# Patient Record
Sex: MALE | Race: AMERICAN INDIAN OR ALASKA NATIVE | NOT HISPANIC OR LATINO | Employment: OTHER | URBAN - METROPOLITAN AREA
[De-identification: names, ages, dates, MRNs, and addresses within clinical notes are randomized per-mention and may not be internally consistent; named-entity substitution may affect disease eponyms.]

---

## 2017-11-28 ENCOUNTER — TELEPHONE (OUTPATIENT)
Dept: ADMINISTRATIVE | Facility: HOSPITAL | Age: 72
End: 2017-11-28

## 2018-03-20 PROBLEM — I48.91 ATRIAL FIBRILLATION: Status: ACTIVE | Noted: 2018-03-20

## 2019-06-09 PROBLEM — R29.818 NEUROLOGICAL DEFICIT PRESENT: Status: ACTIVE | Noted: 2019-06-09

## 2019-06-09 PROBLEM — R27.0 ATAXIA: Status: ACTIVE | Noted: 2019-06-09

## 2019-06-09 PROBLEM — I63.9 CVA (CEREBRAL VASCULAR ACCIDENT): Status: ACTIVE | Noted: 2019-06-09

## 2019-07-02 PROBLEM — Z79.01 LONG TERM (CURRENT) USE OF ANTICOAGULANTS: Status: ACTIVE | Noted: 2019-07-02

## 2019-08-06 PROBLEM — L02.91 ABSCESS: Status: ACTIVE | Noted: 2019-08-06

## 2019-12-31 PROBLEM — R31.0 GROSS HEMATURIA: Status: ACTIVE | Noted: 2019-12-31

## 2020-01-01 PROBLEM — R31.9 URINARY TRACT INFECTION WITH HEMATURIA: Status: ACTIVE | Noted: 2020-01-01

## 2020-01-01 PROBLEM — N39.0 URINARY TRACT INFECTION WITH HEMATURIA: Status: ACTIVE | Noted: 2020-01-01

## 2020-01-07 PROBLEM — C64.9: Status: ACTIVE | Noted: 2020-01-07

## 2020-01-10 PROBLEM — R31.9 URINARY TRACT INFECTION WITH HEMATURIA: Status: RESOLVED | Noted: 2020-01-01 | Resolved: 2020-01-10

## 2020-01-10 PROBLEM — E63.9 INADEQUATE DIETARY ENERGY INTAKE: Status: ACTIVE | Noted: 2020-01-10

## 2020-01-10 PROBLEM — R31.0 GROSS HEMATURIA: Status: RESOLVED | Noted: 2019-12-31 | Resolved: 2020-01-10

## 2020-01-10 PROBLEM — K59.00 CONSTIPATION: Chronic | Status: ACTIVE | Noted: 2020-01-10

## 2020-01-10 PROBLEM — N39.0 URINARY TRACT INFECTION WITH HEMATURIA: Status: RESOLVED | Noted: 2020-01-01 | Resolved: 2020-01-10

## 2020-01-11 PROBLEM — K56.7 ILEUS: Status: ACTIVE | Noted: 2020-01-11

## 2020-01-13 PROBLEM — K56.7 ILEUS: Status: RESOLVED | Noted: 2020-01-11 | Resolved: 2020-01-13

## 2020-01-20 PROBLEM — C64.9: Status: RESOLVED | Noted: 2020-01-07 | Resolved: 2020-01-20

## 2020-01-22 PROBLEM — L89.92 PRESSURE INJURY, STAGE 2: Status: ACTIVE | Noted: 2020-01-22

## 2020-04-21 PROBLEM — D75.1 POLYCYTHEMIA, SECONDARY: Status: ACTIVE | Noted: 2020-04-21

## 2020-07-29 PROBLEM — R31.29 MICROSCOPIC HEMATURIA: Status: ACTIVE | Noted: 2020-07-29

## 2020-08-03 PROBLEM — Z85.528 HISTORY OF TRANSITIONAL CELL CARCINOMA OF KIDNEY: Status: ACTIVE | Noted: 2020-08-03

## 2021-05-04 ENCOUNTER — PATIENT MESSAGE (OUTPATIENT)
Dept: RESEARCH | Facility: HOSPITAL | Age: 76
End: 2021-05-04

## 2021-05-17 PROBLEM — K57.92 DIVERTICULITIS: Status: ACTIVE | Noted: 2021-05-17

## 2021-06-19 PROBLEM — Z99.11 ON MECHANICALLY ASSISTED VENTILATION: Status: ACTIVE | Noted: 2021-06-19

## 2021-06-19 PROBLEM — R65.20 SEVERE SEPSIS: Status: ACTIVE | Noted: 2021-06-19

## 2021-06-19 PROBLEM — E87.20 LACTIC ACIDOSIS: Status: ACTIVE | Noted: 2021-06-19

## 2021-06-19 PROBLEM — N17.9 AKI (ACUTE KIDNEY INJURY): Status: ACTIVE | Noted: 2021-06-19

## 2021-06-19 PROBLEM — J44.1 COPD EXACERBATION: Status: ACTIVE | Noted: 2021-06-19

## 2021-06-19 PROBLEM — J96.01 ACUTE HYPOXEMIC RESPIRATORY FAILURE: Status: ACTIVE | Noted: 2021-06-19

## 2021-06-19 PROBLEM — I46.9 CARDIAC ARREST: Status: ACTIVE | Noted: 2021-06-19

## 2021-06-19 PROBLEM — A41.9 SEVERE SEPSIS: Status: ACTIVE | Noted: 2021-06-19

## 2021-06-19 PROBLEM — R57.9 SHOCK: Status: ACTIVE | Noted: 2021-06-19

## 2021-06-21 PROBLEM — R63.8 INADEQUATE ORAL INTAKE: Status: ACTIVE | Noted: 2021-06-21

## 2021-06-23 PROBLEM — R63.8 INADEQUATE ORAL INTAKE: Status: RESOLVED | Noted: 2021-06-21 | Resolved: 2021-06-23

## 2021-06-25 PROBLEM — E66.9 OBESITY, CLASS I, BMI 30-34.9: Status: ACTIVE | Noted: 2021-06-25

## 2021-06-25 PROBLEM — E83.39 HYPERPHOSPHATEMIA: Status: ACTIVE | Noted: 2021-06-25

## 2021-06-25 PROBLEM — R53.81 PHYSICAL DECONDITIONING: Status: ACTIVE | Noted: 2021-06-25

## 2021-06-25 PROBLEM — F33.1 MDD (MAJOR DEPRESSIVE DISORDER), RECURRENT EPISODE, MODERATE: Status: ACTIVE | Noted: 2021-06-25

## 2021-06-25 PROBLEM — N40.0 BPH (BENIGN PROSTATIC HYPERPLASIA): Status: ACTIVE | Noted: 2021-06-25

## 2021-06-25 PROBLEM — K08.9 POOR DENTITION: Status: ACTIVE | Noted: 2021-06-25

## 2021-06-25 PROBLEM — R74.01 TRANSAMINITIS: Status: ACTIVE | Noted: 2021-06-25

## 2021-06-25 PROBLEM — D72.829 LEUKOCYTOSIS: Status: ACTIVE | Noted: 2021-06-25

## 2021-06-25 PROBLEM — F33.0 MDD (MAJOR DEPRESSIVE DISORDER), RECURRENT EPISODE, MILD: Status: ACTIVE | Noted: 2021-06-25

## 2021-06-25 PROBLEM — B37.1: Status: ACTIVE | Noted: 2021-06-25

## 2021-06-25 PROBLEM — D64.9 NORMOCYTIC ANEMIA: Status: ACTIVE | Noted: 2021-06-25

## 2021-06-27 PROBLEM — E55.9 VITAMIN D DEFICIENCY: Status: ACTIVE | Noted: 2021-06-27

## 2021-06-27 PROBLEM — I47.29 NSVT (NONSUSTAINED VENTRICULAR TACHYCARDIA): Status: ACTIVE | Noted: 2021-06-27

## 2023-01-31 ENCOUNTER — OFFICE VISIT (OUTPATIENT)
Dept: INTERVENTIONAL RADIOLOGY/VASCULAR | Facility: CLINIC | Age: 78
End: 2023-01-31
Payer: MEDICARE

## 2023-01-31 VITALS
HEART RATE: 75 BPM | SYSTOLIC BLOOD PRESSURE: 152 MMHG | HEIGHT: 66 IN | BODY MASS INDEX: 28.14 KG/M2 | DIASTOLIC BLOOD PRESSURE: 79 MMHG | WEIGHT: 175.06 LBS

## 2023-01-31 DIAGNOSIS — Z85.528 HISTORY OF TRANSITIONAL CELL CARCINOMA OF KIDNEY: ICD-10-CM

## 2023-01-31 DIAGNOSIS — R59.9 LYMPH NODE ENLARGEMENT: Primary | ICD-10-CM

## 2023-01-31 DIAGNOSIS — D49.9 NEOPLASM: ICD-10-CM

## 2023-01-31 PROCEDURE — 99999 PR PBB SHADOW E&M-EST. PATIENT-LVL III: ICD-10-PCS | Mod: PBBFAC,,,

## 2023-01-31 PROCEDURE — 1159F MED LIST DOCD IN RCRD: CPT | Mod: CPTII,S$GLB,,

## 2023-01-31 PROCEDURE — 1160F PR REVIEW ALL MEDS BY PRESCRIBER/CLIN PHARMACIST DOCUMENTED: ICD-10-PCS | Mod: CPTII,S$GLB,,

## 2023-01-31 PROCEDURE — 1160F RVW MEDS BY RX/DR IN RCRD: CPT | Mod: CPTII,S$GLB,,

## 2023-01-31 PROCEDURE — 99204 PR OFFICE/OUTPT VISIT, NEW, LEVL IV, 45-59 MIN: ICD-10-PCS | Mod: S$GLB,,,

## 2023-01-31 PROCEDURE — 3077F PR MOST RECENT SYSTOLIC BLOOD PRESSURE >= 140 MM HG: ICD-10-PCS | Mod: CPTII,S$GLB,,

## 2023-01-31 PROCEDURE — 3078F DIAST BP <80 MM HG: CPT | Mod: CPTII,S$GLB,,

## 2023-01-31 PROCEDURE — 3078F PR MOST RECENT DIASTOLIC BLOOD PRESSURE < 80 MM HG: ICD-10-PCS | Mod: CPTII,S$GLB,,

## 2023-01-31 PROCEDURE — 99999 PR PBB SHADOW E&M-EST. PATIENT-LVL III: CPT | Mod: PBBFAC,,,

## 2023-01-31 PROCEDURE — 99204 OFFICE O/P NEW MOD 45 MIN: CPT | Mod: S$GLB,,,

## 2023-01-31 PROCEDURE — 3077F SYST BP >= 140 MM HG: CPT | Mod: CPTII,S$GLB,,

## 2023-01-31 PROCEDURE — 1159F PR MEDICATION LIST DOCUMENTED IN MEDICAL RECORD: ICD-10-PCS | Mod: CPTII,S$GLB,,

## 2023-01-31 NOTE — PROGRESS NOTES
"Subjective:       Patient ID: Maninder Quiros is a 77 y.o. male.    Chief Complaint: Back Pain    Referral from Dr. Ronda Shore MD for a retroperitoneal LN biopsy.   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 Patient was evaluated in 12/2022 for lumbar radiculopathy in the ED. CT lumbar during workup revealed "retroperitoneal lymphadenopathy concerning for malignancy." Denies fever, SOB, or Chest pain. Endorses some unexpected weight loss. No other complaints today. Would like to proceed with scheduling biopsy.     PMH and Medications reviewed.   Heme/onc clinic note reviewed.   History of PINA non compliant with CPAP due to recall.   Patient takes Eliquis and Aspirin (will hold prior to procedure).     Review of Systems   Constitutional:  Positive for activity change, appetite change and unexpected weight change. Negative for fatigue and fever.   HENT:  Positive for nasal congestion and sinus pressure/congestion.    Respiratory:  Negative for cough and shortness of breath.    Cardiovascular:  Negative for chest pain and leg swelling.   Gastrointestinal:  Negative for abdominal pain, constipation, diarrhea, nausea and vomiting.   Genitourinary:  Negative for difficulty urinating and dysuria.   Musculoskeletal:  Positive for back pain (Has improved.).   Neurological:  Negative for dizziness and headaches.   Psychiatric/Behavioral:  Negative for behavioral problems and confusion.        Objective:      Physical Exam  Constitutional:       General: He is not in acute distress.     Appearance: Normal appearance. He is not toxic-appearing or diaphoretic.   HENT:      Head: Normocephalic and atraumatic.      Right Ear: External ear normal.      Left Ear: External ear normal.      Nose: Nose normal.   Eyes:      General: Lids are normal.         Right eye: No discharge.         Left eye: No discharge.      Conjunctiva/sclera: " Conjunctivae normal.   Cardiovascular:      Rate and Rhythm: Normal rate and regular rhythm.   Pulmonary:      Effort: Pulmonary effort is normal. No accessory muscle usage or respiratory distress.      Breath sounds: Normal breath sounds.   Abdominal:      General: Abdomen is flat. Bowel sounds are normal. There is no distension.      Palpations: Abdomen is soft. There is no mass.      Tenderness: There is no abdominal tenderness. There is no right CVA tenderness or left CVA tenderness.   Musculoskeletal:         General: No tenderness (Lumbar).      Right lower leg: No edema.      Left lower leg: No edema.      Comments: Back brace in place.    Skin:     General: Skin is warm and dry.      Coloration: Skin is not jaundiced.   Neurological:      General: No focal deficit present.      Mental Status: He is alert and oriented to person, place, and time.      Cranial Nerves: No facial asymmetry.      Coordination: Coordination normal.      Gait: Gait is intact.   Psychiatric:         Attention and Perception: Attention normal.         Mood and Affect: Mood normal.         Speech: Speech normal.         Behavior: Behavior normal.       CT Abdomen and Pelvis (1/13/2023)      Assessment and Plan       Lymph node enlargement  -     IR Biopsy Lymph Node; Future; Expected date: 01/31/2023  -     CBC Auto Differential; Future; Expected date: 01/31/2023  -     Protime-INR; Future; Expected date: 01/31/2023    - Retroperitoneal LN biopsy approved by Dr. Monterroso.  - Patient will hold Eliquis 2 days prior to the procedure (normal kidney function on labs 1/10/23587)  - Patient will hold aspirin 5 days prior to procedure.    Discussed how the procedure will be performed, risks (including, but not limited to, pain, bleeding, infection, damage to nearby structures, SEEDING, and the need for additional procedures), benefits, possible complications, pre-post procedure expectations, and alternatives. The patient voices understanding and  all questions have been answered.  The patient agrees to proceed as planned. Patient scheduled for 3/1/2023. Pre-procedure handout with clinic phone number provided.    Emily Davidson PA-C  Interventional Radiology  [phone]

## 2023-01-31 NOTE — LETTER
January 31, 2023    Maninder Quiros  [address]  [place] [address]     [facility]  [address]  [facility] [address]  Phone: [phone] PRE-PROCEDURE INSTRUCTIONS    Your procedure with Interventional Radiology is scheduled for 3/1/2023. Please arrive by 10:30am.    DO NOT take Eliquis for 2 days before your procedure.    **Do not eat or drink anything between midnight and the time of your procedure. This includes gum, mints, and candy lemon drops.    **Do not smoke or drink alcoholic beverages 24 hours prior to your procedure.    **If you wear contact lenses, dentures, hearing aids, or glasses, bring a container to put them in during the procedure and give them to a family member for safekeeping.    **If you have been diagnosed with sleep apnea please bring your CPAP machine.    **If your doctor has scheduled you for an overnight stay, bring a small overnight bag with any personal items that you may need.    **Make arrangements in advance for transportation home by a responsible adult. It is not safe to drive a vehicle during the 24 hours following the procedure.    **All [facility] facilities and properties are tobacco free. Smoking is NOT allowed.    PLEASE NOTE: The procedure schedule has many variables which affect the time of your procedure. Family members should be available if your surgery time changes.    If you have any questions about these instructions call Interventional Radiology at [phone] Monday - Friday between 8:00am and 4:00pm or [phone] (ask for interventional radiology resident) for after hours.

## 2023-02-28 ENCOUNTER — PATIENT MESSAGE (OUTPATIENT)
Dept: INTERVENTIONAL RADIOLOGY/VASCULAR | Facility: HOSPITAL | Age: 78
End: 2023-02-28
Payer: MEDICARE

## 2023-02-28 ENCOUNTER — DOCUMENTATION ONLY (OUTPATIENT)
Dept: PREADMISSION TESTING | Facility: HOSPITAL | Age: 78
End: 2023-02-28
Payer: MEDICARE

## 2023-03-01 ENCOUNTER — HOSPITAL ENCOUNTER (OUTPATIENT)
Dept: INTERVENTIONAL RADIOLOGY/VASCULAR | Facility: HOSPITAL | Age: 78
Discharge: HOME | End: 2023-03-01
Payer: MEDICARE

## 2023-03-01 ENCOUNTER — ANESTHESIA (OUTPATIENT)
Dept: INTERVENTIONAL RADIOLOGY/VASCULAR | Facility: HOSPITAL | Age: 78
End: 2023-03-01
Payer: MEDICARE

## 2023-03-01 VITALS
TEMPERATURE: 98 F | DIASTOLIC BLOOD PRESSURE: 85 MMHG | SYSTOLIC BLOOD PRESSURE: 160 MMHG | OXYGEN SATURATION: 93 % | HEIGHT: 66 IN | HEART RATE: 82 BPM | RESPIRATION RATE: 17 BRPM | BODY MASS INDEX: 28.28 KG/M2 | WEIGHT: 176 LBS

## 2023-03-01 DIAGNOSIS — R59.9 LYMPH NODE ENLARGEMENT: ICD-10-CM

## 2023-03-01 DIAGNOSIS — Z85.528 HISTORY OF TRANSITIONAL CELL CARCINOMA OF KIDNEY: ICD-10-CM

## 2023-03-01 LAB
POCT GLUCOSE: 113 MG/DL (ref 70–110)
POCT GLUCOSE: 122 MG/DL (ref 70–110)

## 2023-03-01 PROCEDURE — 88307 PR  SURG PATH,LEVEL V: ICD-10-PCS | Mod: 26,,, | Performed by: PATHOLOGY

## 2023-03-01 PROCEDURE — 88333 PR  INTRAOPERATIVE CYTO PATH CONSULT, INITIAL SITE: ICD-10-PCS | Mod: 26,,, | Performed by: PATHOLOGY

## 2023-03-01 PROCEDURE — 88342 IMHCHEM/IMCYTCHM 1ST ANTB: CPT | Mod: 59 | Performed by: PATHOLOGY

## 2023-03-01 PROCEDURE — 88185 FLOWCYTOMETRY/TC ADD-ON: CPT | Mod: 59 | Performed by: PATHOLOGY

## 2023-03-01 PROCEDURE — 88341 PR IHC OR ICC EACH ADD'L SINGLE ANTIBODY  STAINPR: ICD-10-PCS | Mod: 26,59,, | Performed by: PATHOLOGY

## 2023-03-01 PROCEDURE — 27201068 IR BIOPSY LYMPH NODE

## 2023-03-01 PROCEDURE — 88307 TISSUE EXAM BY PATHOLOGIST: CPT | Performed by: PATHOLOGY

## 2023-03-01 PROCEDURE — 25000003 PHARM REV CODE 250: Performed by: NURSE ANESTHETIST, CERTIFIED REGISTERED

## 2023-03-01 PROCEDURE — 88307 TISSUE EXAM BY PATHOLOGIST: CPT | Mod: 26,,, | Performed by: PATHOLOGY

## 2023-03-01 PROCEDURE — D9220A PRA ANESTHESIA: ICD-10-PCS | Mod: ANES,,, | Performed by: ANESTHESIOLOGY

## 2023-03-01 PROCEDURE — 88342 CHG IMMUNOCYTOCHEMISTRY: ICD-10-PCS | Mod: 26,59,, | Performed by: PATHOLOGY

## 2023-03-01 PROCEDURE — 38505 IR BIOPSY LYMPH NODE: ICD-10-PCS | Mod: LT,,, | Performed by: STUDENT IN AN ORGANIZED HEALTH CARE EDUCATION/TRAINING PROGRAM

## 2023-03-01 PROCEDURE — 63600175 PHARM REV CODE 636 W HCPCS: Performed by: NURSE ANESTHETIST, CERTIFIED REGISTERED

## 2023-03-01 PROCEDURE — 77012 CT SCAN FOR NEEDLE BIOPSY: CPT | Mod: TC | Performed by: STUDENT IN AN ORGANIZED HEALTH CARE EDUCATION/TRAINING PROGRAM

## 2023-03-01 PROCEDURE — 37000009 HC ANESTHESIA EA ADD 15 MINS

## 2023-03-01 PROCEDURE — 88341 IMHCHEM/IMCYTCHM EA ADD ANTB: CPT | Performed by: PATHOLOGY

## 2023-03-01 PROCEDURE — 38505 NEEDLE BIOPSY LYMPH NODES: CPT | Performed by: STUDENT IN AN ORGANIZED HEALTH CARE EDUCATION/TRAINING PROGRAM

## 2023-03-01 PROCEDURE — 88342 IMHCHEM/IMCYTCHM 1ST ANTB: CPT | Mod: 26,59,, | Performed by: PATHOLOGY

## 2023-03-01 PROCEDURE — 88184 FLOWCYTOMETRY/ TC 1 MARKER: CPT | Performed by: PATHOLOGY

## 2023-03-01 PROCEDURE — D9220A PRA ANESTHESIA: Mod: ANES,,, | Performed by: ANESTHESIOLOGY

## 2023-03-01 PROCEDURE — 88189 FLOWCYTOMETRY/READ 16 & >: CPT | Mod: ,,, | Performed by: PATHOLOGY

## 2023-03-01 PROCEDURE — D9220A PRA ANESTHESIA: Mod: CRNA,,, | Performed by: NURSE ANESTHETIST, CERTIFIED REGISTERED

## 2023-03-01 PROCEDURE — 77012 PR  CT GUIDANCE NEEDLE PLACEMENT: ICD-10-PCS | Mod: 26,,, | Performed by: STUDENT IN AN ORGANIZED HEALTH CARE EDUCATION/TRAINING PROGRAM

## 2023-03-01 PROCEDURE — 77012 CT SCAN FOR NEEDLE BIOPSY: CPT | Mod: 26,,, | Performed by: STUDENT IN AN ORGANIZED HEALTH CARE EDUCATION/TRAINING PROGRAM

## 2023-03-01 PROCEDURE — 82962 GLUCOSE BLOOD TEST: CPT

## 2023-03-01 PROCEDURE — 88189 PR  FLOWCYTOMETRY/READ, 16 & > MARKERS: ICD-10-PCS | Mod: ,,, | Performed by: PATHOLOGY

## 2023-03-01 PROCEDURE — 37000008 HC ANESTHESIA 1ST 15 MINUTES

## 2023-03-01 PROCEDURE — 88333 PATH CONSLTJ SURG CYTO XM 1: CPT | Performed by: PATHOLOGY

## 2023-03-01 PROCEDURE — 88333 PATH CONSLTJ SURG CYTO XM 1: CPT | Mod: 26,,, | Performed by: PATHOLOGY

## 2023-03-01 PROCEDURE — 25000003 PHARM REV CODE 250: Performed by: FAMILY MEDICINE

## 2023-03-01 PROCEDURE — D9220A PRA ANESTHESIA: ICD-10-PCS | Mod: CRNA,,, | Performed by: NURSE ANESTHETIST, CERTIFIED REGISTERED

## 2023-03-01 PROCEDURE — 88341 IMHCHEM/IMCYTCHM EA ADD ANTB: CPT | Mod: 26,59,, | Performed by: PATHOLOGY

## 2023-03-01 RX ORDER — HYDROMORPHONE HYDROCHLORIDE 1 MG/ML
0.2 INJECTION, SOLUTION INTRAMUSCULAR; INTRAVENOUS; SUBCUTANEOUS EVERY 5 MIN PRN
Status: DISCONTINUED | OUTPATIENT
Start: 2023-03-01 | End: 2023-03-02 | Stop reason: HOSPADM

## 2023-03-01 RX ORDER — PROCHLORPERAZINE EDISYLATE 5 MG/ML
5 INJECTION INTRAMUSCULAR; INTRAVENOUS EVERY 30 MIN PRN
Status: DISCONTINUED | OUTPATIENT
Start: 2023-03-01 | End: 2023-03-02 | Stop reason: HOSPADM

## 2023-03-01 RX ORDER — ONDANSETRON 2 MG/ML
INJECTION INTRAMUSCULAR; INTRAVENOUS
Status: DISCONTINUED | OUTPATIENT
Start: 2023-03-01 | End: 2023-03-01

## 2023-03-01 RX ORDER — LABETALOL HYDROCHLORIDE 5 MG/ML
INJECTION, SOLUTION INTRAVENOUS
Status: DISCONTINUED | OUTPATIENT
Start: 2023-03-01 | End: 2023-03-01

## 2023-03-01 RX ORDER — PROPOFOL 10 MG/ML
VIAL (ML) INTRAVENOUS
Status: DISCONTINUED | OUTPATIENT
Start: 2023-03-01 | End: 2023-03-01

## 2023-03-01 RX ORDER — LIDOCAINE HYDROCHLORIDE 10 MG/ML
1 INJECTION, SOLUTION EPIDURAL; INFILTRATION; INTRACAUDAL; PERINEURAL ONCE
Status: DISCONTINUED | OUTPATIENT
Start: 2023-03-01 | End: 2023-03-02 | Stop reason: HOSPADM

## 2023-03-01 RX ORDER — LIDOCAINE HYDROCHLORIDE 20 MG/ML
INJECTION INTRAVENOUS
Status: DISCONTINUED | OUTPATIENT
Start: 2023-03-01 | End: 2023-03-01

## 2023-03-01 RX ORDER — FENTANYL CITRATE 50 UG/ML
INJECTION, SOLUTION INTRAMUSCULAR; INTRAVENOUS
Status: DISCONTINUED | OUTPATIENT
Start: 2023-03-01 | End: 2023-03-01

## 2023-03-01 RX ORDER — SODIUM CHLORIDE 9 MG/ML
INJECTION, SOLUTION INTRAVENOUS CONTINUOUS
Status: DISCONTINUED | OUTPATIENT
Start: 2023-03-01 | End: 2023-03-02 | Stop reason: HOSPADM

## 2023-03-01 RX ORDER — SODIUM CHLORIDE 0.9 % (FLUSH) 0.9 %
3 SYRINGE (ML) INJECTION
Status: DISCONTINUED | OUTPATIENT
Start: 2023-03-01 | End: 2023-03-02 | Stop reason: HOSPADM

## 2023-03-01 RX ORDER — DEXAMETHASONE SODIUM PHOSPHATE 4 MG/ML
INJECTION, SOLUTION INTRA-ARTICULAR; INTRALESIONAL; INTRAMUSCULAR; INTRAVENOUS; SOFT TISSUE
Status: DISCONTINUED | OUTPATIENT
Start: 2023-03-01 | End: 2023-03-01

## 2023-03-01 RX ORDER — PHENYLEPHRINE HYDROCHLORIDE 10 MG/ML
INJECTION INTRAVENOUS
Status: DISCONTINUED | OUTPATIENT
Start: 2023-03-01 | End: 2023-03-01

## 2023-03-01 RX ORDER — HALOPERIDOL 5 MG/ML
0.5 INJECTION INTRAMUSCULAR EVERY 10 MIN PRN
Status: DISCONTINUED | OUTPATIENT
Start: 2023-03-01 | End: 2023-03-02 | Stop reason: HOSPADM

## 2023-03-01 RX ORDER — ROCURONIUM BROMIDE 10 MG/ML
INJECTION, SOLUTION INTRAVENOUS
Status: DISCONTINUED | OUTPATIENT
Start: 2023-03-01 | End: 2023-03-01

## 2023-03-01 RX ADMIN — LIDOCAINE HYDROCHLORIDE 100 MG: 20 INJECTION INTRAVENOUS at 02:03

## 2023-03-01 RX ADMIN — PHENYLEPHRINE HYDROCHLORIDE 100 MCG: 10 INJECTION INTRAVENOUS at 02:03

## 2023-03-01 RX ADMIN — DEXAMETHASONE SODIUM PHOSPHATE 4 MG: 4 INJECTION, SOLUTION INTRAMUSCULAR; INTRAVENOUS at 02:03

## 2023-03-01 RX ADMIN — PROPOFOL 150 MG: 10 INJECTION, EMULSION INTRAVENOUS at 02:03

## 2023-03-01 RX ADMIN — PHENYLEPHRINE HYDROCHLORIDE 200 MCG: 10 INJECTION INTRAVENOUS at 02:03

## 2023-03-01 RX ADMIN — ROCURONIUM BROMIDE 50 MG: 10 INJECTION INTRAVENOUS at 02:03

## 2023-03-01 RX ADMIN — LABETALOL HYDROCHLORIDE 10 MG: 5 INJECTION, SOLUTION INTRAVENOUS at 03:03

## 2023-03-01 RX ADMIN — SODIUM CHLORIDE: 0.9 INJECTION, SOLUTION INTRAVENOUS at 02:03

## 2023-03-01 RX ADMIN — ONDANSETRON 4 MG: 2 INJECTION INTRAMUSCULAR; INTRAVENOUS at 02:03

## 2023-03-01 RX ADMIN — FENTANYL CITRATE 100 MCG: 50 INJECTION, SOLUTION INTRAMUSCULAR; INTRAVENOUS at 02:03

## 2023-03-01 NOTE — PLAN OF CARE
Discharge instructions given, patient and family member, verbalized understanding. Consents verified. Vitals back to baseline. Pt tolerating po liquids. Dressing to left back clean, dry and intact.

## 2023-03-01 NOTE — PLAN OF CARE
Pt arrived to room IR CT for Retroperitoneal Lymph Node biopsy. Pt oriented to room and introduced to staff. Pt.  positioned prone, feet first on table. monitors applied.  Anesthesia present for procedure. Please see anesthesia record for medications, assessments and vital signs.

## 2023-03-01 NOTE — H&P
"Radiology Pre-procedure Note    History of Present Illness:  Maninder Quiros is a 77 y.o. 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 Patient was evaluated in 12/2022 for lumbar radiculopathy in the ED. CT lumbar during workup revealed "retroperitoneal lymphadenopathy concerning for malignancy."     Patient presents for Left retroperitoneal lymph node biopsy.     Past Medical History:   Diagnosis Date    Anticoagulant long-term use     Arthritis     Atrial fibrillation     Cancer 2020    right nephrectomy    COPD (chronic obstructive pulmonary disease)     Coronary artery disease     Diabetes mellitus, type 2     ANDERSON (dyspnea on exertion)     Hyperlipidemia     Hypertension     Leukocytosis     MI (myocardial infarction) 2015    Sleep apnea     c pap not in use    Stroke 2019    Wears glasses      Past Surgical History:   Procedure Laterality Date    ADENOIDECTOMY      APPENDECTOMY      BIOPSY OF BLADDER  8/3/2020    Procedure: BIOPSY, BLADDER;  Surgeon: Tyrone Rousseau MD;  Location: [facility];  Service: Urology;;    BLADDER FULGURATION N/A 8/3/2020    Procedure: FULGURATION, BLADDER;  Surgeon: Tyrone Rousseau MD;  Location: [facility] OR;  Service: Urology;  Laterality: N/A;    CHOLECYSTECTOMY      COLONOSCOPY      COLONOSCOPY N/A 3/1/2016    Procedure: COLONOSCOPY;  Surgeon: Austin Lyn MD;  Location: [facility];  Service: Endoscopy;  Laterality: N/A;    CYSTOSCOPY W/ RETROGRADES Bilateral 1/3/2020    Procedure: CYSTOSCOPY WITH RETROGRADE PYELOGRAM;  Surgeon: Tyrone Rousseau MD;  Location: [facility];  Service: Urology;  Laterality: Bilateral;    CYSTOSCOPY W/ RETROGRADES Left 8/3/2020    Procedure: CYSTOSCOPY WITH RETROGRADE PYELOGRAM;  Surgeon: Tyrone Rousseau MD;  Location: [facility];  Service: Urology;  Laterality: Left;  covid screen in progress 7/29/2020    CYSTOSCOPY W/ RETROGRADES Left 4/27/2021    Procedure: CYSTOSCOPY WITH " RETROGRADE PYELOGRAM;  Surgeon: Tyrone Rousseau MD;  Location: [facility] OR;  Service: Urology;  Laterality: Left;  7am per Revee    CYSTOSCOPY W/ RETROGRADES Left 12/2/2021    Procedure: CYSTOSCOPY WITH RETROGRADE PYELOGRAM;  Surgeon: Tyrone Rousseau MD;  Location: [facility] OR;  Service: Urology;  Laterality: Left;    DIGITAL RECTAL EXAMINATION UNDER ANESTHESIA N/A 4/27/2021    Procedure: EXAM UNDER ANESTHESIA, DIGITAL, RECTUM;  Surgeon: Tyrone Rousseau MD;  Location: [facility] OR;  Service: Urology;  Laterality: N/A;    DILATION OF URETHRA N/A 1/3/2020    Procedure: DILATION, URETHRA;  Surgeon: Tyrone Rousseau MD;  Location: [facility] OR;  Service: Urology;  Laterality: N/A;    DILATION OF URETHRA N/A 8/3/2020    Procedure: DILATION, URETHRA;  Surgeon: Tyrone Rousseau MD;  Location: [facility] OR;  Service: Urology;  Laterality: N/A;    DILATION OF URETHRA  12/2/2021    Procedure: DILATION, URETHRA;  Surgeon: Tyrone Rousseau MD;  Location: [facility] OR;  Service: Urology;;    ESOPHAGOGASTRODUODENOSCOPY      INTERNAL URETHROTOMY N/A 1/3/2020    Procedure: URETHROTOMY, INTERNAL;  Surgeon: Tyrone Rousseau MD;  Location: [facility] OR;  Service: Urology;  Laterality: N/A;    NEPHRECTOMY Right 01/02/2020    SPLENECTOMY, TOTAL      TONSILLECTOMY      URETERAL STENT PLACEMENT Right 1/3/2020    Procedure: INSERTION, STENT, URETER;  Surgeon: Tyrone Rousseau MD;  Location: [facility] OR;  Service: Urology;  Laterality: Right;    URETEROSCOPY Right 1/3/2020    Procedure: URETEROSCOPY with Biopsy;  Surgeon: Tyrone Rousseau MD;  Location: [facility];  Service: Urology;  Laterality: Right;       Review of Systems:   As documented in primary team H&P    Home Meds:   Prior to Admission medications    Medication Sig Start Date End Date Taking? Authorizing Provider   albuterol (PROVENTIL/VENTOLIN HFA) 90 mcg/actuation inhaler 2 puffs as needed    Historical Provider   apixaban (ELIQUIS) 5 mg Tab Take 5 mg by mouth 2 (two) times daily.    Historical Provider  "  aspirin 81 MG Chew Take 81 mg by mouth once daily.     Historical Provider   atorvastatin (LIPITOR) 40 MG tablet Take 1 tablet (40 mg total) by mouth every evening. 6/28/21 6/28/22  Hussain Villa NP   benazepriL (LOTENSIN) 20 MG tablet Take 20 mg by mouth nightly.    Historical Provider   blood glucose control, low (TRUE METRIX LEVEL 1) Soln 1 Dose by Misc.(Non-Drug; Combo Route) route as needed. Use as directed 4/24/19 8/31/24  Jose De Jesus Coleman MD   blood sugar diagnostic (TRUE METRIX GLUCOSE TEST STRIP) Strp 1 strip by Misc.(Non-Drug; Combo Route) route once daily. Dx E11.8 4/24/19   Jose De Jesus Coleman MD   calcium acetate,phosphat bind, (PHOSLO) 667 mg tablet Take 1 tablet (667 mg total) by mouth 3 (three) times daily.  Patient taking differently: Take 1,334 mg by mouth 3 (three) times daily. 6/28/21 2/28/23  Hussain Villa NP   citalopram (CELEXA) 20 MG tablet Take 1 tablet (20 mg total) by mouth once daily. 8/6/19 2/28/23  Henry Norton MD   ezetimibe (ZETIA) 10 mg tablet Take 10 mg by mouth every evening. 4/29/21   Historical Provider   finasteride (PROSCAR) 5 mg tablet Take 5 mg by mouth once daily. 11/24/20   Historical Provider   HYDROcodone-acetaminophen (NORCO) 5-325 mg per tablet Take 1 tablet by mouth every 6 (six) hours as needed for Pain.  Patient not taking: Reported on 2/28/2023 12/29/22   Lea Sheppard NP   lancets (TRUEPLUS LANCETS) 33 gauge Misc 1 lancet by Misc.(Non-Drug; Combo Route) route once daily. Dx E11.8 4/24/19   Jose De Jesus Coleman MD   metFORMIN (GLUCOPHAGE) 1000 MG tablet Take 1,000 mg by mouth 2 (two) times daily. 8/20/20   Historical Provider   metoprolol tartrate (LOPRESSOR) 25 MG tablet TAKE ONE TABLET BY MOUTH TWICE DAILY  Patient taking differently: Take 25 mg by mouth 2 (two) times daily. 1/14/19   Jose De Jesus Coleman MD   pen needle, diabetic (PEN NEEDLE) 32 gauge x 5/32" Ndle 1 application by Misc.(Non-Drug; Combo Route) route every evening. 6/11/19   Akash" MD [place]   tamsulosin (FLOMAX) 0.4 mg Cap Take 0.4 mg by mouth every evening. 11/24/20   Historical Provider   TURMERIC ORAL Take 2,250 mg by mouth 3 (three) times daily.    Historical Provider     Scheduled Meds:    LIDOcaine (PF) 10 mg/ml (1%)  1 mL Other Once     Continuous Infusions:    sodium chloride 0.9%       PRN Meds:  Anticoagulants/Antiplatelets: aspirin and eliquis per chart review     Allergies: Review of patient's allergies indicates:  No Known Allergies  Sedation Hx: have not been any systemic reactions    Labs:  No results for input(s): INR in the last 168 hours.    Invalid input(s):  PT,  PTT  No results for input(s): WBC, HGB, HCT, MCV, PLT in the last 168 hours. No results for input(s): GLU, NA, K, CL, CO2, BUN, CREATININE, CALCIUM, MG, ALT, AST, ALBUMIN, BILITOT, BILIDIR in the last 168 hours.      Vitals:        Physical Exam:  ASA: III  Mallampati: per anesthesia     General: no acute distress  Mental Status: alert and oriented to person, place and time  HEENT: normocephalic, atraumatic  Chest: unlabored breathing  Abdomen: nondistended  Extremity: moves all extremities      Plan:  Sedation Plan: per anesthesia.  Patient will undergo Left retroperitoneal lymph node biopsy.      Karissa Proctor MD  PGY-II  Diagnostic and Interventional Radiology  [facility]

## 2023-03-01 NOTE — ANESTHESIA PREPROCEDURE EVALUATION
03/01/2023  Maninder Quiros is a 77 y.o., male.    Patient Active Problem List   Diagnosis    Type 2 diabetes mellitus without complication    HTN (hypertension)    HLD (hyperlipidemia)    Stage 1 mild COPD by GOLD classification with exacerbation and bacterial pneumonia    Osteoarthritis of right shoulder region    Coronary artery disease involving native coronary artery without angina pectoris    Nuclear sclerosis of both eyes    Type 2 diabetes mellitus with complication, without long-term current use of insulin    Traumatic subconjunctival hemorrhage of left eye    Atrial fibrillation    Ataxia    Neurological deficit present    Hx CVA (cerebral vascular accident)    Abscess    Inadequate dietary energy intake    Constipation    Ileus    Pressure injury, stage 2    Polycythemia, secondary    Microscopic hematuria    History of transitional cell carcinoma of kidney    Solitary left kidney    Diverticulitis    TRACY (acute kidney injury)    Cardiac arrest    Acute hypoxemic respiratory failure    Cardiogenic and septic shock    Lactic acidosis    On mechanically assisted ventilation    Obesity, Class I, BMI 30-34.9    Transaminitis    MDD (major depressive disorder), recurrent episode, mild    BPH (benign prostatic hyperplasia)    Physical deconditioning    Poor dentition    Leukocytosis    Normocytic anemia    Hyperphosphatemia    Pulmonary candidiasis    NSVT (nonsustained ventricular tachycardia)    Vitamin D deficiency         Pre-op Assessment    I have reviewed the Patient Summary Reports.     I have reviewed the Nursing Notes. I have reviewed the NPO Status.   I have reviewed the Medications.     Review of Systems  Cardiovascular:   Hypertension Past MI CAD   ANDERSON    Musculoskeletal:   Arthritis     Neurological:   CVA    Psych:   Psychiatric History           Physical Exam  General: Well nourished    Airway:  Mallampati: III / II  Mouth Opening: Normal  TM Distance: Normal  Tongue: Normal  Neck ROM: Normal ROM        Anesthesia Plan  Type of Anesthesia, risks & benefits discussed:    Anesthesia Type: Gen ETT  Intra-op Monitoring Plan: Standard ASA Monitors  Post Op Pain Control Plan: multimodal analgesia  Induction:  IV  Airway Plan: Direct, Post-Induction  Informed Consent: Informed consent signed with the Patient and all parties understand the risks and agree with anesthesia plan.  All questions answered. Patient consented to blood products? Yes  ASA Score: 3  Day of Surgery Review of History & Physical: H&P Update referred to the surgeon/provider.    Ready For Surgery From Anesthesia Perspective.     .      ECHO:  Final Impressions   1. The study quality is average.   2. Global left ventricular systolic function is normal. The left   ventricular ejection fraction is 70%. Left ventricular diastolic   function is abnormal (stage I impaired relaxation).   3. Concentric left ventricular hypertrophy is present. It is mild.   4. Right ventricular systolic function is normal. Right ventricular   diastolic dimension is 3.8 cms. Right ventricular systolic pressure is   14 mmHg. TAPSE measures 1.9cm.   5. The pulmonary artery systolic pressure is 14 mmHg.   6.  The bubble study was negative, ruling out patent foramen ovale.         Lab Results   Component Value Date    WBC 11.62 02/15/2023    HGB 15.8 02/15/2023    HCT 50.2 02/15/2023    MCV 89 02/15/2023     02/15/2023       BMP  Lab Results   Component Value Date     01/10/2023    K 4.7 01/10/2023     01/10/2023    CO2 28 01/10/2023    BUN 22 (H) 01/10/2023    CREATININE 1.16 01/10/2023    CALCIUM 8.8 01/10/2023    ANIONGAP 7 (L) 01/10/2023    EGFRNORACEVR >60 01/10/2023

## 2023-03-01 NOTE — ANESTHESIA PROCEDURE NOTES
Intubation    Date/Time: 3/1/2023 2:11 PM  Performed by: Timmy Lee CRNA  Authorized by: Peter Vuong MD     Intubation:     Induction:  Intravenous    Intubated:  Postinduction    Mask Ventilation:  Easy with oral airway    Attempts:  1    Attempted By:  JEANA (LOREN Wallace CRNA)    Method of Intubation:  Video laryngoscopy    Blade:  Umana 3    Laryngeal View Grade: Grade I - full view of cords      Difficult Airway Encountered?: No      Complications:  None    Airway Device:  Oral endotracheal tube    Airway Device Size:  7.0    Style/Cuff Inflation:  Cuffed (inflated to minimal occlusive pressure)    Tube secured:  23    Secured at:  The lips    Placement Verified By:  Capnometry    Complicating Factors:  None    Findings Post-Intubation:  BS equal bilateral and atraumatic/condition of teeth unchanged

## 2023-03-01 NOTE — DISCHARGE INSTRUCTIONS
Removed dressing in 48 hours, ok to shower in 48 hours.  Regular diet, regular medications  Call MD for any signs of infection, fever, swelling, bleeding.

## 2023-03-01 NOTE — TRANSFER OF CARE
"Anesthesia Transfer of Care Note    Patient: Maninder Quiros    Procedure(s) Performed: * No procedures listed *    Patient location: [facility]    Anesthesia Type: general    Transport from OR: Transported from OR on 6-10 L/min O2 by face mask with adequate spontaneous ventilation    Post pain: adequate analgesia    Post assessment: no apparent anesthetic complications    Post vital signs: stable    Level of consciousness: awake, alert and oriented    Nausea/Vomiting: no nausea/vomiting    Complications: none    Transfer of care protocol was followed      Last vitals:   Visit Vitals  BP (!) 156/80   Pulse 83   Temp 36.8 °C (98.2 °F) (Temporal)   Resp 18   Ht 5' 6" (1.676 m)   Wt 79.8 kg (176 lb)   SpO2 99%   BMI 28.41 kg/m²     "

## 2023-03-03 LAB
FLOW CYTOMETRY ANTIBODIES ANALYZED - LYMPH NODE: NORMAL
FLOW CYTOMETRY COMMENT - LYMPH NODE: NORMAL
FLOW CYTOMETRY INTERPRETATION - LYMPH NODE: NORMAL

## 2023-03-04 NOTE — ANESTHESIA POSTPROCEDURE EVALUATION
Anesthesia Post Evaluation    Patient: Maninder Quiros    Procedure(s) Performed: * No procedures listed *    Final Anesthesia Type: general      Patient location during evaluation: PACU  Patient participation: Yes- Able to Participate  Level of consciousness: awake and alert and oriented  Post-procedure vital signs: reviewed and stable  Pain management: adequate  Airway patency: patent  PINA mitigation strategies: Intraoperative administration of CPAP, nasopharyngeal airway, or oral appliance during sedation, Multimodal analgesia, Extubation while patient is awake, Verification of full reversal of neuromuscular block and Extubation and recovery carried out in lateral, semiupright, or other nonsupine position  PONV status at discharge: No PONV  Anesthetic complications: no      Cardiovascular status: hemodynamically stable  Respiratory status: unassisted  Hydration status: euvolemic  Follow-up not needed.          Vitals Value Taken Time   /85 03/01/23 1701   Temp 36.7 °C (98.1 °F) 03/01/23 1700   Pulse 79 03/01/23 1714   Resp 21 03/01/23 1714   SpO2 94 % 03/01/23 1714   Vitals shown include unvalidated device data.      No case tracking events are documented in the log.      Pain/Ana Score: No data recorded

## 2023-03-06 NOTE — PROCEDURES
"  Pre Op Diagnosis: Retroperitoneal LN   Post Op Diagnosis: Same    Procedure: LN biopsy    Procedure performed by: Kriss    Written Informed Consent Obtained: Yes  Specimen Removed: YES 5 18g cores  Estimated Blood Loss: Minimal    Findings:   Successful CT guided biopsy of retroperitoneal LN    Patient tolerated procedure well.    Shashi Monterroso MD ([place])  Interventional Radiology  [phone]      "

## 2023-03-12 LAB
ADEQUACY: NORMAL
COMMENT: NORMAL
FINAL PATHOLOGIC DIAGNOSIS: NORMAL
GROSS: NORMAL
Lab: NORMAL
MICROSCOPIC EXAM: NORMAL